# Patient Record
Sex: FEMALE | Race: OTHER | URBAN - METROPOLITAN AREA
[De-identification: names, ages, dates, MRNs, and addresses within clinical notes are randomized per-mention and may not be internally consistent; named-entity substitution may affect disease eponyms.]

---

## 2021-10-15 ENCOUNTER — OFFICE VISIT (OUTPATIENT)
Dept: OPHTHALMOLOGY | Facility: CLINIC | Age: 86
End: 2021-10-15
Payer: MEDICARE

## 2021-10-15 DIAGNOSIS — H26.493 AFTER-CATARACT WITH VISION OBSCURED OF BOTH EYES: ICD-10-CM

## 2021-10-15 DIAGNOSIS — Z96.1 PSEUDOPHAKIA OF BOTH EYES: ICD-10-CM

## 2021-10-15 DIAGNOSIS — H51.11 CONVERGENCE INSUFFICIENCY: Primary | ICD-10-CM

## 2021-10-15 PROCEDURE — 92004 COMPRE OPH EXAM NEW PT 1/>: CPT | Performed by: OPHTHALMOLOGY

## 2021-10-15 RX ORDER — AMLODIPINE BESYLATE 2.5 MG/1
TABLET ORAL
COMMUNITY
Start: 2021-10-10

## 2021-10-15 RX ORDER — IBUPROFEN 200 MG
CAPSULE ORAL
COMMUNITY
Start: 2021-10-07

## 2021-10-15 RX ORDER — AMMONIUM LACTATE 12 G/100G
CREAM TOPICAL
COMMUNITY
Start: 2021-08-23

## 2021-10-15 RX ORDER — MAGNESIUM HYDROXIDE 1200 MG/15ML
SUSPENSION ORAL
COMMUNITY
Start: 2021-07-09

## 2021-10-15 RX ORDER — CYANOCOBALAMIN 1000 UG/ML
INJECTION INTRAMUSCULAR; SUBCUTANEOUS
COMMUNITY
Start: 2021-07-31

## 2021-10-15 RX ORDER — FLUOXETINE HYDROCHLORIDE 20 MG/1
CAPSULE ORAL
COMMUNITY
Start: 2021-10-03

## 2021-10-15 RX ORDER — MEMANTINE HYDROCHLORIDE 5 MG/1
TABLET ORAL
COMMUNITY
Start: 2021-09-29

## 2021-10-15 RX ORDER — APIXABAN 5 MG/1
TABLET, FILM COATED ORAL
COMMUNITY
Start: 2021-10-10

## 2021-10-15 RX ORDER — POLYETHYLENE GLYCOL 3350 17 G/17G
POWDER, FOR SOLUTION ORAL
COMMUNITY
Start: 2021-07-09

## 2021-10-15 RX ORDER — SENNOSIDES 8.6 MG
TABLET ORAL
COMMUNITY
Start: 2021-08-25

## 2021-10-15 RX ORDER — ACETAMINOPHEN 325 MG/1
TABLET ORAL
COMMUNITY
Start: 2021-10-09

## 2021-10-15 NOTE — ASSESSMENT & PLAN NOTE
Patient complains of horizontal double vision when reading. Refer to Dr. Anne Josue for evaluation and treatment. Patient was told to bring reading only glasses.

## 2021-10-15 NOTE — PROGRESS NOTES
Josefina Conn is a 80year old female. HPI:     HPI     New patient here for a complete exam.  Patient complains of occasional horizontal double vision when reading for long periods of time when she is wearing her OTC reading glasses.   She also feels l Tonometry ([facility], 11:32 AM)       Right Left    Pressure 12 13          Pupils       Pupils    Right PERRL    Left PERRL          Visual Fields       Left Right     Full Full          Extraocular Movement       Right Left     Full Full   Alternating encounter.       Meds This Visit:  Requested Prescriptions      No prescriptions requested or ordered in this encounter        Follow up instructions:  Return in about 7 weeks (around 12/3/2021) for diplopia evaluation with Dr. Aileen Ibrahim and then in 1 year wit

## 2021-10-15 NOTE — ASSESSMENT & PLAN NOTE
Patient's vision is 20/20 in each eye today, so will not schedule YAG lasers at this time. Patient will call to schedule a YAG laser in the right eye if she notes blurry vision.

## 2021-10-15 NOTE — PATIENT INSTRUCTIONS
Convergence insufficiency  Patient complains of horizontal double vision when reading. Refer to Dr. Anne Josue for evaluation and treatment. Patient was told to bring reading only glasses. Pseudophakia of both eyes  No treatment.      After-cataract with v

## 2021-11-17 ENCOUNTER — TELEPHONE (OUTPATIENT)
Dept: OPHTHALMOLOGY | Facility: CLINIC | Age: 86
End: 2021-11-17

## 2021-11-17 NOTE — TELEPHONE ENCOUNTER
Spoke with nurse Reyna Davis and she says they can try and bring patient to her 12/3/21 appointment at 8:15. I will call the daughter to confirm. I moved the appointment to 8:15. Confirmed with daughter today.       11/18/21 spoke with Marilyn Pinon who is patient

## 2021-11-17 NOTE — TELEPHONE ENCOUNTER
Spoke with nurse Binh Kolb and she says they can try and bring patient to her 12/3/21 appointment at 8:15. I will call the daughter to confirm. I moved the appointment to 8:15.

## 2021-12-03 ENCOUNTER — OFFICE VISIT (OUTPATIENT)
Dept: OPHTHALMOLOGY | Facility: CLINIC | Age: 86
End: 2021-12-03
Payer: MEDICARE

## 2021-12-03 DIAGNOSIS — H52.4 PRESBYOPIA OF BOTH EYES: ICD-10-CM

## 2021-12-03 DIAGNOSIS — H26.493 AFTER-CATARACT WITH VISION OBSCURED OF BOTH EYES: ICD-10-CM

## 2021-12-03 DIAGNOSIS — H01.00A BLEPHARITIS OF UPPER AND LOWER EYELIDS OF BOTH EYES, UNSPECIFIED TYPE: ICD-10-CM

## 2021-12-03 DIAGNOSIS — H51.11 CONVERGENCE INSUFFICIENCY: Primary | ICD-10-CM

## 2021-12-03 DIAGNOSIS — H52.203 MYOPIA OF BOTH EYES WITH ASTIGMATISM: ICD-10-CM

## 2021-12-03 DIAGNOSIS — H50.34 INTERMITTENT ALTERNATING EXOTROPIA: ICD-10-CM

## 2021-12-03 DIAGNOSIS — H01.00B BLEPHARITIS OF UPPER AND LOWER EYELIDS OF BOTH EYES, UNSPECIFIED TYPE: ICD-10-CM

## 2021-12-03 DIAGNOSIS — H52.13 MYOPIA OF BOTH EYES WITH ASTIGMATISM: ICD-10-CM

## 2021-12-03 PROCEDURE — 92060 SENSORIMOTOR EXAMINATION: CPT | Performed by: OPHTHALMOLOGY

## 2021-12-03 PROCEDURE — 92015 DETERMINE REFRACTIVE STATE: CPT | Performed by: OPHTHALMOLOGY

## 2021-12-03 PROCEDURE — 99213 OFFICE O/P EST LOW 20 MIN: CPT | Performed by: OPHTHALMOLOGY

## 2021-12-03 NOTE — ASSESSMENT & PLAN NOTE
Patient instructed to use lid hygiene once daily. Apply baby shampoo to a warm washcloth eyelids gently with eyes closed or use Ocusoft Lid Scrubs as directed. Diagnosis discussed with patient.   Use artificial tears (any over the counter brand is okay)

## 2021-12-03 NOTE — PATIENT INSTRUCTIONS
Convergence insufficiency  No diplopia seen today. Pt see's single today; no prism needed. Discussed convergence exercises with parent. Told to try to do them for 5 minutes 2 times a day.          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

## 2021-12-03 NOTE — ASSESSMENT & PLAN NOTE
No diplopia seen today. Pt see's single today; no prism needed. Discussed convergence exercises with parent. Told to try to do them for 5 minutes 2 times a day.

## 2021-12-05 PROBLEM — H52.203 MYOPIA OF BOTH EYES WITH ASTIGMATISM: Status: ACTIVE | Noted: 2021-12-05

## 2021-12-05 PROBLEM — H50.34 INTERMITTENT ALTERNATING EXOTROPIA: Status: ACTIVE | Noted: 2021-12-05

## 2021-12-05 PROBLEM — H52.13 MYOPIA OF BOTH EYES WITH ASTIGMATISM: Status: ACTIVE | Noted: 2021-12-05

## 2021-12-05 PROBLEM — H52.4 PRESBYOPIA OF BOTH EYES: Status: ACTIVE | Noted: 2021-12-05

## 2021-12-05 NOTE — ASSESSMENT & PLAN NOTE
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 No diplopia today on exam.   Reading glasses prescribed. No prism needed. Convergence exercises recommended.

## 2021-12-05 NOTE — PROGRESS NOTES
Crow Simon is a 80year old female. HPI:     HPI     NP/ 80year old F here for a diplopia evaluation.  LDE: 10/15/21 (with MAC) with a history of convergence insufficiency (horizontal double vision at near since August 2021), pseudophakia in both e Allergies:  No Known Allergies    ROS:     ROS     Positive for: Eyes    Negative for: Constitutional, Gastrointestinal, Neurological, Skin, Genitourinary, Musculoskeletal, HENT, Endocrine, Cardiovascular, Respiratory, Psychiatric, Allergic/Imm, Heme Normal    C/D Ratio 0.4 0.4    Good red reflex OU            Refraction     Wearing Rx       Sphere Cylinder    Right +2.50 Sphere    Left +2.50 Sphere    Type: OTC reading only          Wearing Rx #2       Sphere Cylinder    Right +2.50 Sphere    Left +2. Follow up instructions:  Return if symptoms worsen or fail to improve. 12/5/2021  Scribed by: Prasanna Najera.  Charlie Auguste MD

## (undated) NOTE — LETTER
December 5, 2021    Katja Garay MD  [address] [place] [address]  Melissa Nolen [address]     Patient: Zuri Squires   YOB: 1933   Date of Visit: 12/3/2021       Dear Dr. Xiomara Peterson MD:    Thank you for referring Zuri Squires to  ELIQUIS 5 MG Oral Tab      • cyanocobalamin 1000 MCG/ML Injection Solution      • FLUoxetine 20 MG Oral Cap      • MILK OF MAGNESIA 400 MG/5ML Oral Suspension      • Memantine HCl 5 MG Oral Tab      • TRIPLE ANTIBIOTIC PLUS 1 % External Ointment      • PEG Cornea Clear Clear    Anterior Chamber Deep and quiet Deep and quiet    Iris Normal Normal    Lens PC IOL with 2+ PC opacity below visual axis OD PC IOL with 1+ PC opacity below visual axis     Vitreous Clear Clear          Fundus Exam       Right Left recommended. Myopia of both eyes with astigmatism  Reading glasses prescribed. Presbyopia of both eyes  Reading glasses prescribed. No orders of the defined types were placed in this encounter.       Meds This Visit:  Requested Prescriptions